# Patient Record
Sex: MALE | Race: WHITE | NOT HISPANIC OR LATINO | Employment: STUDENT | URBAN - METROPOLITAN AREA
[De-identification: names, ages, dates, MRNs, and addresses within clinical notes are randomized per-mention and may not be internally consistent; named-entity substitution may affect disease eponyms.]

---

## 2020-10-20 ENCOUNTER — OFFICE VISIT (OUTPATIENT)
Dept: FAMILY MEDICINE | Facility: CLINIC | Age: 25
End: 2020-10-20
Payer: COMMERCIAL

## 2020-10-20 VITALS
SYSTOLIC BLOOD PRESSURE: 111 MMHG | HEART RATE: 96 BPM | DIASTOLIC BLOOD PRESSURE: 64 MMHG | HEIGHT: 70 IN | WEIGHT: 205.94 LBS | BODY MASS INDEX: 29.48 KG/M2 | OXYGEN SATURATION: 97 % | TEMPERATURE: 98 F

## 2020-10-20 DIAGNOSIS — Z23 ENCOUNTER FOR ADMINISTRATION OF VACCINE: ICD-10-CM

## 2020-10-20 DIAGNOSIS — M54.50 CHRONIC BILATERAL LOW BACK PAIN WITHOUT SCIATICA: ICD-10-CM

## 2020-10-20 DIAGNOSIS — Z00.00 ANNUAL PHYSICAL EXAM: Primary | ICD-10-CM

## 2020-10-20 DIAGNOSIS — E66.3 OVERWEIGHT (BMI 25.0-29.9): ICD-10-CM

## 2020-10-20 DIAGNOSIS — G89.29 CHRONIC BILATERAL LOW BACK PAIN WITHOUT SCIATICA: ICD-10-CM

## 2020-10-20 PROCEDURE — 99999 PR PBB SHADOW E&M-NEW PATIENT-LVL III: CPT | Mod: PBBFAC,,, | Performed by: FAMILY MEDICINE

## 2020-10-20 PROCEDURE — 90471 IMMUNIZATION ADMIN: CPT | Mod: S$GLB,,, | Performed by: FAMILY MEDICINE

## 2020-10-20 PROCEDURE — 99999 PR PBB SHADOW E&M-NEW PATIENT-LVL III: ICD-10-PCS | Mod: PBBFAC,,, | Performed by: FAMILY MEDICINE

## 2020-10-20 PROCEDURE — 99385 PREV VISIT NEW AGE 18-39: CPT | Mod: 25,S$GLB,, | Performed by: FAMILY MEDICINE

## 2020-10-20 PROCEDURE — 90471 FLU VACCINE (QUAD) GREATER THAN OR EQUAL TO 3YO PRESERVATIVE FREE IM: ICD-10-PCS | Mod: S$GLB,,, | Performed by: FAMILY MEDICINE

## 2020-10-20 PROCEDURE — 90686 FLU VACCINE (QUAD) GREATER THAN OR EQUAL TO 3YO PRESERVATIVE FREE IM: ICD-10-PCS | Mod: S$GLB,,, | Performed by: FAMILY MEDICINE

## 2020-10-20 PROCEDURE — 90686 IIV4 VACC NO PRSV 0.5 ML IM: CPT | Mod: S$GLB,,, | Performed by: FAMILY MEDICINE

## 2020-10-20 PROCEDURE — 99385 PR PREVENTIVE VISIT,NEW,18-39: ICD-10-PCS | Mod: 25,S$GLB,, | Performed by: FAMILY MEDICINE

## 2020-10-20 RX ORDER — NAPROXEN 500 MG/1
500 TABLET ORAL 2 TIMES DAILY WITH MEALS
Qty: 30 TABLET | Refills: 0 | Status: SHIPPED | OUTPATIENT
Start: 2020-10-20

## 2020-10-20 RX ORDER — CYCLOBENZAPRINE HCL 10 MG
10 TABLET ORAL NIGHTLY
Qty: 30 TABLET | Refills: 1 | Status: SHIPPED | OUTPATIENT
Start: 2020-10-20

## 2020-10-20 NOTE — PROGRESS NOTES
Office Visit    Patient Name: Artemio Todd Jr.    : 1995  MRN: [id number]      Assessment/Plan:  Artemio Todd Jr. is a 25 y.o. male who presents today for :    Annual physical exam  -     Hemoglobin A1C; Future; Expected date: 10/20/2020  -     CBC Without Differential; Future; Expected date: 10/20/2020  -     Comprehensive Metabolic Panel; Future; Expected date: 10/20/2020  -     Lipid Panel; Future; Expected date: 10/20/2020  -     HIV 1/2 Ag/Ab (4th Gen); Future; Expected date: 10/20/2020  -     Hepatitis C Antibody; Future; Expected date: 10/20/2020  Encounter for administration of vaccine  -     Influenza - Quadrivalent (PF)  Overweight (BMI 25.0-29.9)  -anticipatory guidance provided with age appropriate preventative services discussed, healthy diet and regular physical exercise also discussed with patient  -Recommend 15-30 minutes of moderate intensity exercise 5 days/week.      Chronic bilateral low back pain without sciatica - controlled on PRN NSAIDs  -     cyclobenzaprine (FLEXERIL) 10 MG tablet; Take 1 tablet (10 mg total) by mouth every evening.  Dispense: 30 tablet; Refill: 1  -     naproxen (NAPROSYN) 500 MG tablet; Take 1 tablet (500 mg total) by mouth 2 (two) times daily with meals.  Dispense: 30 tablet; Refill: 0  -exam c/w mm spasm with no red flags. Add heat/massage and avoid provocative movements that could worsen pain, maintain good posture, as well as using proper lifting techniques.  -initiate Naproxen and muscle relaxer  - may takeTylenol PRN. Counseled patient extensively on stretching/strengthening exercises, maintaining good posture as demonstrated in clinic (handout also given) to help with decreasing risk for future injury.  -may use back brace if needed, especially with strenuous activities or any lifting      Follow up PRN    This note was created by combination of typed  and MModal dictation.  Transcription errors may be present.  If there are any questions,  please contact me.        ----------------------------------------------------------------------------------------------------------------------      HPI:  Patient Care Team:  Dangelo Rogers MD as PCP - General (Family Medicine)    Artemio is a 25 y.o. male with      Patient Active Problem List   Diagnosis    Delinquent immunization status    Anaphylactic reaction    Overweight (BMI 25.0-29.9)    Chronic bilateral low back pain without sciatica - controlled on PRN NSAIDs       This patient is new to me     Patient presents today for:  Annual Exam, Establish Care, and Back Pain      Patient is doing well and has no major complaints today except for chronic LBP that he has had since college, for which he takes Ibuprofen as needed with good relief every once in while. 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 No radiation to legs, no changes in BMs/urinartion. Health maintenance-wise, he is due for routine bloodwork. He has an overall balanced diet and tries to stay physically active.        Additional ROS  No F/C/wt changes/fatigue  No dysphagia/sore throat/rhinorrhea  No CP/GRANADOS/palpitations/swelling  No cough/wheezing/SOB  No nausea/vomiting/abd pain/no diarrhea, no constipation, no blood in stool  No muscle aches, +back pain  No rashes  No MSK weakness/HA/tingling/numbness  No anxiety/depression  No dysuria/hematuria  No polyuria/polydipsia/cold or hot intolerance          Current Medications  Medications reviewed and updated.       Current Outpatient Medications:     cyclobenzaprine (FLEXERIL) 10 MG tablet, Take 1 tablet (10 mg total) by mouth every evening., Disp: 30 tablet, Rfl: 1    naproxen (NAPROSYN) 500 MG tablet, Take 1 tablet (500 mg total) by mouth 2 (two) times daily with meals., Disp: 30 tablet, Rfl: 0    History reviewed. No pertinent surgical  "history.    Family History   Problem Relation Age of Onset    Asthma Maternal Grandmother     Hypertension Maternal Grandmother     Cancer Maternal Grandfather     Cancer Paternal Grandfather     Cancer Maternal Grandmother        Social History     Socioeconomic History    Marital status: Single     Spouse name: Not on file    Number of children: Not on file    Years of education: Not on file    Highest education level: Not on file   Occupational History    Occupation: student   Social Needs    Financial resource strain: Not on file    Food insecurity     Worry: Not on file     Inability: Not on file    Transportation needs     Medical: Not on file     Non-medical: Not on file   Tobacco Use    Smoking status: Never Smoker    Smokeless tobacco: Former User     Types: Chew   Substance and Sexual Activity    Alcohol use: No    Drug use: No    Sexual activity: Yes     Partners: Female     Birth control/protection: Condom   Lifestyle    Physical activity     Days per week: Not on file     Minutes per session: Not on file    Stress: Not on file   Relationships    Social connections     Talks on phone: Not on file     Gets together: Not on file     Attends Oriental orthodox service: Not on file     Active member of club or organization: Not on file     Attends meetings of clubs or organizations: Not on file     Relationship status: Not on file   Other Topics Concern    Not on file   Social History Narrative    Patient lives with his mother, father and 2 younger brothers. 1 dog, 1 rug.  No smokers but patient does chew tobacco.  Currently in the 11th grade, enjoys English and plays football.           Allergies   Review of patient's allergies indicates:  No Known Allergies          Review of Systems  See HPI      Physical Exam  /64   Pulse 96   Temp 98.3 °F (36.8 °C)   Ht 5' 10" (1.778 m)   Wt 93.4 kg (205 lb 14.6 oz)   SpO2 97%   BMI 29.54 kg/m²     GEN: NAD, well developed, pleasant, well " nourished  HEENT: NCAT, PERRLA, EOMI, sclera clear, anicteric, bilateral ear exam wnl, O/P clear, MMM with no lesions  NECK: normal, supple with midline trachea, no LAD, no thyromegaly  LUNGS: CTAB, no w/r/r, no increased work of breathing   HEART: RRR, normal S1 and S2, no m/r/g, no edema  ABD: s/nt/nd, NABS  SKIN: normal turgor, no rashes  PSYCH: AOx3, appropriate mood and affect  MSK: warm/well perfused, normal ROM in all extremities except as noted below, no c/c  BACK: normal alignment of spine. FROM of back. Normal gait.  +TTP over the L4 area over b/l paraspinal mm. Spine is atraumatic and nontender without step-off.+ mild pain with forward flexion and backward extension. No pain with lateral bending. NEG straight leg raise. No swelling/redness.      NEURO: normal without focal findings, CN II-XII are grossly intact.  Sensation/strength grossly normal, gait and station normal.         Labs  No results found for: LABA1C, HGBA1C  Lab Results   Component Value Date     08/24/2013    K 3.9 08/24/2013     08/24/2013    CO2 20 (L) 08/24/2013    BUN 9 08/24/2013    CREATININE 0.9 08/24/2013    CALCIUM 10.0 08/24/2013    ANIONGAP 14.0 08/24/2013    ESTGFRAFRICA >60 08/24/2013    EGFRNONAA >60 08/24/2013     No results found for: CHOL  No results found for: HDL  No results found for: LDLCALC  No results found for: TRIG  No results found for: CHOLHDL  Last set of blood work has been reviewed as noted above.

## 2021-03-31 ENCOUNTER — IMMUNIZATION (OUTPATIENT)
Dept: OBSTETRICS AND GYNECOLOGY | Facility: CLINIC | Age: 26
End: 2021-03-31
Payer: COMMERCIAL

## 2021-03-31 DIAGNOSIS — Z23 NEED FOR VACCINATION: Primary | ICD-10-CM

## 2021-03-31 PROCEDURE — 91300 COVID-19, MRNA, LNP-S, PF, 30 MCG/0.3 ML DOSE VACCINE: CPT | Mod: PBBFAC | Performed by: FAMILY MEDICINE

## 2021-04-21 ENCOUNTER — IMMUNIZATION (OUTPATIENT)
Dept: OBSTETRICS AND GYNECOLOGY | Facility: CLINIC | Age: 26
End: 2021-04-21
Payer: COMMERCIAL

## 2021-04-21 DIAGNOSIS — Z23 NEED FOR VACCINATION: Primary | ICD-10-CM

## 2021-04-21 PROCEDURE — 91300 COVID-19, MRNA, LNP-S, PF, 30 MCG/0.3 ML DOSE VACCINE: CPT | Mod: PBBFAC | Performed by: FAMILY MEDICINE

## 2021-04-21 PROCEDURE — 0002A COVID-19, MRNA, LNP-S, PF, 30 MCG/0.3 ML DOSE VACCINE: CPT | Mod: PBBFAC | Performed by: FAMILY MEDICINE

## 2021-12-14 ENCOUNTER — IMMUNIZATION (OUTPATIENT)
Dept: OBSTETRICS AND GYNECOLOGY | Facility: CLINIC | Age: 26
End: 2021-12-14
Payer: COMMERCIAL

## 2021-12-14 DIAGNOSIS — Z23 NEED FOR VACCINATION: Primary | ICD-10-CM

## 2021-12-14 PROCEDURE — 91300 COVID-19, MRNA, LNP-S, PF, 30 MCG/0.3 ML DOSE VACCINE: CPT | Mod: PBBFAC | Performed by: FAMILY MEDICINE

## 2021-12-14 PROCEDURE — 0003A COVID-19, MRNA, LNP-S, PF, 30 MCG/0.3 ML DOSE VACCINE: CPT | Mod: PBBFAC | Performed by: FAMILY MEDICINE

## 2022-03-16 DIAGNOSIS — Z11.59 NEED FOR HEPATITIS C SCREENING TEST: ICD-10-CM

## 2022-05-31 ENCOUNTER — PATIENT MESSAGE (OUTPATIENT)
Dept: ADMINISTRATIVE | Facility: HOSPITAL | Age: 27
End: 2022-05-31
Payer: COMMERCIAL

## 2023-06-12 ENCOUNTER — PATIENT OUTREACH (OUTPATIENT)
Dept: ADMINISTRATIVE | Facility: HOSPITAL | Age: 28
End: 2023-06-12
Payer: COMMERCIAL

## 2023-06-12 DIAGNOSIS — I10 PRIMARY HYPERTENSION: Primary | ICD-10-CM

## 2023-06-12 DIAGNOSIS — Z00.00 ANNUAL PHYSICAL EXAM: ICD-10-CM

## 2023-06-12 DIAGNOSIS — Z11.59 NEED FOR HEPATITIS C SCREENING TEST: ICD-10-CM
